# Patient Record
Sex: FEMALE | Race: OTHER | URBAN - METROPOLITAN AREA
[De-identification: names, ages, dates, MRNs, and addresses within clinical notes are randomized per-mention and may not be internally consistent; named-entity substitution may affect disease eponyms.]

---

## 2017-12-21 ENCOUNTER — OFFICE VISIT (OUTPATIENT)
Dept: FAMILY MEDICINE CLINIC | Facility: CLINIC | Age: 22
End: 2017-12-21

## 2017-12-21 VITALS
WEIGHT: 145 LBS | HEART RATE: 103 BPM | SYSTOLIC BLOOD PRESSURE: 115 MMHG | HEIGHT: 62 IN | BODY MASS INDEX: 26.68 KG/M2 | TEMPERATURE: 99 F | DIASTOLIC BLOOD PRESSURE: 77 MMHG

## 2017-12-21 DIAGNOSIS — J06.9 VIRAL UPPER RESPIRATORY TRACT INFECTION: Primary | ICD-10-CM

## 2017-12-21 DIAGNOSIS — R68.89 INFLUENZA-LIKE SYMPTOMS: ICD-10-CM

## 2017-12-21 PROCEDURE — 99213 OFFICE O/P EST LOW 20 MIN: CPT | Performed by: NURSE PRACTITIONER

## 2017-12-21 NOTE — PATIENT INSTRUCTIONS
UPPER RESPIRATORY INFECTION-VIRAL  (COLD)    Colds are due to viral infections and are very common. Sore throat is a prominent, and often the first, symptom.  The cough that accompanies most colds is annoying but helps physiologically to protect the lungs a unable to tolerate fluids, ie-vomitting ( after 6 hours for children or elderly, 8-12 hours for healthy adults)  -to ER if patient becomes lethargic  -to ER if patient is a child and has no urine output in 6 hours  -call if symptoms do not improve within 2

## 2017-12-21 NOTE — PROGRESS NOTES
HPI  Pt presents for headache, congestion, coughing with mild sore throat, fever and chills and body aches. Vomitted yesterday and was sent home from work. Felt dizzy yesterday. No flu shot this year.   Has been taking ibuprofen for ha and zquil to help s time. She appears well-developed and well-nourished. She appears distressed (mildly ill appearing). HENT:   Head: Normocephalic and atraumatic.    Right Ear: External ear normal.   Left Ear: External ear normal.   Nose: Mucosal edema and rhinorrhea presen

## 2024-03-17 ENCOUNTER — HOSPITAL ENCOUNTER (EMERGENCY)
Facility: HOSPITAL | Age: 29
Discharge: HOME | End: 2024-03-17
Attending: EMERGENCY MEDICINE
Payer: COMMERCIAL

## 2024-03-17 VITALS
DIASTOLIC BLOOD PRESSURE: 76 MMHG | HEART RATE: 77 BPM | OXYGEN SATURATION: 97 % | WEIGHT: 170 LBS | BODY MASS INDEX: 31 KG/M2 | SYSTOLIC BLOOD PRESSURE: 120 MMHG | RESPIRATION RATE: 18 BRPM | TEMPERATURE: 97 F

## 2024-03-17 DIAGNOSIS — S16.1XXA STRAIN OF NECK MUSCLE, INITIAL ENCOUNTER: ICD-10-CM
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
## 2024-03-17 PROCEDURE — 99283 EMERGENCY DEPT VISIT LOW MDM: CPT

## 2024-03-17 PROCEDURE — 99284 EMERGENCY DEPT VISIT MOD MDM: CPT

## 2024-03-17 RX ORDER — IBUPROFEN 400 MG/1
400 TABLET ORAL ONCE
Status: COMPLETED | OUTPATIENT
Start: 2024-03-17 | End: 2024-03-17

## 2024-03-17 RX ORDER — ACETAMINOPHEN 500 MG
1000 TABLET ORAL ONCE
Status: COMPLETED | OUTPATIENT
Start: 2024-03-17 | End: 2024-03-17
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
## (undated) NOTE — LETTER
Date & Time: 3/17/2024, 8:44 PM  Patient: Aleks Ni  Encounter Provider(s):    Ar Pang MD       To Whom It May Concern:    Aleks Ni was seen and treated in our department on 3/17/2024. She should not return to work until 03/19/2024 .    If you have any questions or concerns, please do not hesitate to call.        _____________________________  RN Signature

## (undated) NOTE — LETTER
12/21/2017          To Whom It May Concern:    Kaycee Alfaro is currently under my medical care and may not return to work at this time. Please excuse Luisana Phlegm for 3 days. She may return to work on December 21, 2017.   Activity is restricted as follows: